# Patient Record
Sex: MALE | Race: WHITE | NOT HISPANIC OR LATINO | Employment: FULL TIME | URBAN - METROPOLITAN AREA
[De-identification: names, ages, dates, MRNs, and addresses within clinical notes are randomized per-mention and may not be internally consistent; named-entity substitution may affect disease eponyms.]

---

## 2023-03-30 ENCOUNTER — OFFICE VISIT (OUTPATIENT)
Dept: URGENT CARE | Facility: URGENT CARE | Age: 28
End: 2023-03-30
Payer: COMMERCIAL

## 2023-03-30 VITALS
TEMPERATURE: 98.5 F | WEIGHT: 202 LBS | HEART RATE: 73 BPM | SYSTOLIC BLOOD PRESSURE: 129 MMHG | DIASTOLIC BLOOD PRESSURE: 87 MMHG | OXYGEN SATURATION: 97 %

## 2023-03-30 DIAGNOSIS — R30.0 DYSURIA: Primary | ICD-10-CM

## 2023-03-30 LAB
ALBUMIN UR-MCNC: NEGATIVE MG/DL
ANION GAP SERPL CALCULATED.3IONS-SCNC: 12 MMOL/L (ref 7–15)
APPEARANCE UR: CLEAR
BASOPHILS # BLD AUTO: 0 10E3/UL (ref 0–0.2)
BASOPHILS NFR BLD AUTO: 0 %
BILIRUB UR QL STRIP: NEGATIVE
BUN SERPL-MCNC: 18.1 MG/DL (ref 6–20)
CALCIUM SERPL-MCNC: 10 MG/DL (ref 8.6–10)
CHLORIDE SERPL-SCNC: 101 MMOL/L (ref 98–107)
COLOR UR AUTO: YELLOW
CREAT SERPL-MCNC: 1.06 MG/DL (ref 0.67–1.17)
DEPRECATED HCO3 PLAS-SCNC: 28 MMOL/L (ref 22–29)
EOSINOPHIL # BLD AUTO: 0.1 10E3/UL (ref 0–0.7)
EOSINOPHIL NFR BLD AUTO: 1 %
ERYTHROCYTE [DISTWIDTH] IN BLOOD BY AUTOMATED COUNT: 13.6 % (ref 10–15)
GFR SERPL CREATININE-BSD FRML MDRD: >90 ML/MIN/1.73M2
GLUCOSE SERPL-MCNC: 96 MG/DL (ref 70–99)
GLUCOSE UR STRIP-MCNC: NEGATIVE MG/DL
HCT VFR BLD AUTO: 47.5 % (ref 40–53)
HGB BLD-MCNC: 15.7 G/DL (ref 13.3–17.7)
HGB UR QL STRIP: NEGATIVE
HOLD SPECIMEN: NORMAL
KETONES UR STRIP-MCNC: NEGATIVE MG/DL
LEUKOCYTE ESTERASE UR QL STRIP: NEGATIVE
LYMPHOCYTES # BLD AUTO: 2.1 10E3/UL (ref 0.8–5.3)
LYMPHOCYTES NFR BLD AUTO: 29 %
MCH RBC QN AUTO: 30.3 PG (ref 26.5–33)
MCHC RBC AUTO-ENTMCNC: 33.1 G/DL (ref 31.5–36.5)
MCV RBC AUTO: 92 FL (ref 78–100)
MONOCYTES # BLD AUTO: 0.6 10E3/UL (ref 0–1.3)
MONOCYTES NFR BLD AUTO: 8 %
NEUTROPHILS # BLD AUTO: 4.4 10E3/UL (ref 1.6–8.3)
NEUTROPHILS NFR BLD AUTO: 62 %
NITRATE UR QL: NEGATIVE
PH UR STRIP: 6.5 [PH] (ref 5–7)
PLATELET # BLD AUTO: 242 10E3/UL (ref 150–450)
POTASSIUM SERPL-SCNC: 4.7 MMOL/L (ref 3.4–5.3)
PSA SERPL DL<=0.01 NG/ML-MCNC: 0.24 NG/ML
RBC # BLD AUTO: 5.19 10E6/UL (ref 4.4–5.9)
SODIUM SERPL-SCNC: 141 MMOL/L (ref 136–145)
SP GR UR STRIP: 1.01 (ref 1–1.03)
UROBILINOGEN UR STRIP-ACNC: 0.2 E.U./DL
WBC # BLD AUTO: 7.1 10E3/UL (ref 4–11)

## 2023-03-30 PROCEDURE — 81003 URINALYSIS AUTO W/O SCOPE: CPT

## 2023-03-30 PROCEDURE — 80048 BASIC METABOLIC PNL TOTAL CA: CPT | Performed by: PHYSICIAN ASSISTANT

## 2023-03-30 PROCEDURE — G0103 PSA SCREENING: HCPCS | Performed by: PHYSICIAN ASSISTANT

## 2023-03-30 PROCEDURE — 85025 COMPLETE CBC W/AUTO DIFF WBC: CPT | Performed by: PHYSICIAN ASSISTANT

## 2023-03-30 PROCEDURE — 36415 COLL VENOUS BLD VENIPUNCTURE: CPT | Performed by: PHYSICIAN ASSISTANT

## 2023-03-30 PROCEDURE — 99203 OFFICE O/P NEW LOW 30 MIN: CPT | Performed by: PHYSICIAN ASSISTANT

## 2023-03-30 RX ORDER — OXCARBAZEPINE 600 MG/1
600 TABLET, FILM COATED ORAL DAILY
COMMUNITY
Start: 2023-02-22

## 2023-03-30 NOTE — PROGRESS NOTES
Dysuria  - UA macro with reflex to Microscopic and Culture - Clinc Collect  - PSA, screen; Future  - CBC with platelets and differential; Future  - Basic metabolic panel  (Ca, Cl, CO2, Creat, Gluc, K, Na, BUN); Future  - Adult Urology  Referral; Future  - PSA, screen  - CBC with platelets and differential  - Basic metabolic panel  (Ca, Cl, CO2, Creat, Gluc, K, Na, BUN)  - Extra Urine (LAB USE ONLY)    The etiology of the patient's symptoms is puzzling.  Awaiting PSA to assess for prostate issues.  Of asked the patient to increase his water intake and consider using Azo over-the-counter.  If no improvement in the next week, I would like him to follow-up with urology for further work-up.    Prieto Schwartz PA-C  [facility]    Subjective   27 year old who presents to clinic today for the following health issues:    UTI       HPI     Genitourinary - Male  Onset/Duration: Today   Description:   Dysuria (painful urination): YES}  Hematuria (blood in urine): No  Frequency: YES  Waking at night to urinate: No  Hesitancy (delay in urine): No  Retention (unable to empty): YES  Decrease in urinary flow: YES  Incontinence: No  Progression of Symptoms:  worsening  Accompanying Signs & Symptoms:  Fever: No  Back/Flank pain: No  Urethral discharge: No  Testicle lumps/masses/pain: No  Nausea and/or vomiting: No  Abdominal pain: No  History:   History of frequent UTI s: No but patient had a similar experience that lasted several days   History of kidney stones: No  History of hernias: No  Personal or Family history of Prostate problems: No  Sexually active: YES- No concern about STIw  Precipitating or alleviating factors: None  Therapies tried and outcome: none    Review of Systems   Review of Systems   See HPI    Objective    Temp: 98.5  F (36.9  C) Temp src: Oral BP: 129/87 Pulse: 73     SpO2: 97 %       Physical Exam   Physical Exam  Constitutional:       General: He is not in acute distress.      Appearance: Normal appearance. He is normal weight. He is not ill-appearing, toxic-appearing or diaphoretic.   HENT:      Head: Normocephalic and atraumatic.   Cardiovascular:      Rate and Rhythm: Normal rate.      Pulses: Normal pulses.   Pulmonary:      Effort: Pulmonary effort is normal. No respiratory distress.   Neurological:      Mental Status: He is alert.   Psychiatric:         Mood and Affect: Mood normal.         Behavior: Behavior normal.         Thought Content: Thought content normal.         Judgment: Judgment normal.          Results for orders placed or performed in visit on 03/30/23 (from the past 24 hour(s))   UA macro with reflex to Microscopic and Culture - Clinc Collect    Specimen: Urine, Midstream   Result Value Ref Range    Color Urine Yellow Colorless, Straw, Light Yellow, Yellow    Appearance Urine Clear Clear    Glucose Urine Negative Negative mg/dL    Bilirubin Urine Negative Negative    Ketones Urine Negative Negative mg/dL    Specific Gravity Urine 1.010 1.003 - 1.035    Blood Urine Negative Negative    pH Urine 6.5 5.0 - 7.0    Protein Albumin Urine Negative Negative mg/dL    Urobilinogen Urine 0.2 0.2, 1.0 E.U./dL    Nitrite Urine Negative Negative    Leukocyte Esterase Urine Negative Negative    Narrative    Microscopic not indicated   CBC with platelets and differential    Narrative    The following orders were created for panel order CBC with platelets and differential.  Procedure                               Abnormality         Status                     ---------                               -----------         ------                     CBC with platelets and d...[[id number]]                      Final result                 Please view results for these tests on the individual orders.   CBC with platelets and differential   Result Value Ref Range    WBC Count 7.1 4.0 - 11.0 10e3/uL    RBC Count 5.19 4.40 - 5.90 10e6/uL    Hemoglobin 15.7 13.3 - 17.7 g/dL    Hematocrit 47.5  40.0 - 53.0 %    MCV 92 78 - 100 fL    MCH 30.3 26.5 - 33.0 pg    MCHC 33.1 31.5 - 36.5 g/dL    RDW 13.6 10.0 - 15.0 %    Platelet Count 242 150 - 450 10e3/uL    % Neutrophils 62 %    % Lymphocytes 29 %    % Monocytes 8 %    % Eosinophils 1 %    % Basophils 0 %    Absolute Neutrophils 4.4 1.6 - 8.3 10e3/uL    Absolute Lymphocytes 2.1 0.8 - 5.3 10e3/uL    Absolute Monocytes 0.6 0.0 - 1.3 10e3/uL    Absolute Eosinophils 0.1 0.0 - 0.7 10e3/uL    Absolute Basophils 0.0 0.0 - 0.2 10e3/uL   Extra Urine (LAB USE ONLY)   Result Value Ref Range    Hold Specimen JIC

## 2023-03-31 ENCOUNTER — TELEPHONE (OUTPATIENT)
Dept: UROLOGY | Facility: CLINIC | Age: 28
End: 2023-03-31
Payer: COMMERCIAL

## 2023-03-31 NOTE — TELEPHONE ENCOUNTER
Health Call Center    Phone Message    May a detailed message be left on voicemail: yes     Reason for Call: Appointment Intake    Referring Provider Name: Prieto Schwartz PA-C    Diagnosis and/or Symptoms: Dysuria    Patient being referred for Urgent Appointment (1-2 weeks) by referring provider. Writer unable to schedule patient within timeframe requested. Sending encounter message for clinic review and follow-up with patient for scheduling. Thank you!     Action Taken: Message routed to:  [facility] Urology    Travel Screening: Not Applicable

## 2023-05-01 ENCOUNTER — OFFICE VISIT (OUTPATIENT)
Dept: URGENT CARE | Facility: URGENT CARE | Age: 28
End: 2023-05-01
Payer: COMMERCIAL

## 2023-05-01 VITALS
DIASTOLIC BLOOD PRESSURE: 74 MMHG | HEART RATE: 92 BPM | WEIGHT: 210 LBS | TEMPERATURE: 98.5 F | SYSTOLIC BLOOD PRESSURE: 117 MMHG | OXYGEN SATURATION: 97 %

## 2023-05-01 DIAGNOSIS — H00.11 CHALAZION OF RIGHT UPPER EYELID: Primary | ICD-10-CM

## 2023-05-01 DIAGNOSIS — H00.12 CHALAZION OF RIGHT LOWER EYELID: ICD-10-CM

## 2023-05-01 PROCEDURE — 99213 OFFICE O/P EST LOW 20 MIN: CPT | Performed by: PHYSICIAN ASSISTANT

## 2023-05-01 RX ORDER — ERYTHROMYCIN 5 MG/G
OINTMENT OPHTHALMIC
COMMUNITY
Start: 2023-04-08

## 2023-05-01 RX ORDER — POLYMYXIN B SULFATE AND TRIMETHOPRIM 1; 10000 MG/ML; [USP'U]/ML
1-2 SOLUTION OPHTHALMIC EVERY 4 HOURS
Qty: 10 ML | Refills: 0 | Status: SHIPPED | OUTPATIENT
Start: 2023-05-01

## 2023-05-01 NOTE — PATIENT INSTRUCTIONS
Recommend using a Q-tip and baby shampoo to rinse the upper and lower eyelids eyelid at least twice per day.  Use warm water.  Place baby shampoo on a Q-tip, retract the eyelid and perform the scrub.    Recommend applying warm compress to affected eye(s) four times per day to relieve irritation and itching.  Apply the compress and 15 to 20-minute intervals.  Recommend Zyrtec or Claritin for itching.     Return to clinic if symptoms persist more than 5 to 7 days or if you develop symptoms such as sinus pain, headaches, ear pain/discharge.    Advise urgent follow up if you have sudden decreased visual acuity, sudden vision changes, increased pain, severely worsening headaches, redness/swelling/tenderness around the eyes.    Follow-up with [facility] if symptoms do not improve in 1 week.  We will need to call and schedule an appointment.

## 2023-05-01 NOTE — PROGRESS NOTES
Assessment & Plan     Chalazion of right upper eyelid      Chalazion of right lower eyelid  Recommend discontinuing erythromycin ointment at this time, starting Polytrim today.  Discussed cares for chalazion, primarily responds to warm compresses.    Recommend using a Q-tip and baby shampoo to rinse the upper and lower eyelids eyelid at least twice per day.  Use warm water.  Place baby shampoo on a Q-tip, retract the eyelid and perform the scrub.    Recommend applying warm compress to affected eye(s) four times per day to relieve irritation and itching.  Apply the compress and 15 to 20-minute intervals.  Recommend Zyrtec or Claritin for itching.     Return to clinic if symptoms persist more than 5 to 7 days or if you develop symptoms such as sinus pain, headaches, ear pain/discharge.    Advise urgent follow up if you have sudden decreased visual acuity, sudden vision changes, increased pain, severely worsening headaches, redness/swelling/tenderness around the eyes.    Follow-up with [facility] if symptoms do not improve in 1 week.  We will need to call and schedule an appointment.                     No follow-ups on file.    Jeremie Mederos PA-C  [facility] [place] Nunes is a 28 year old, presenting for the following health issues:  Eye Problem (Right side bottom and top lumps/bumps, possible stye? Has a Rx. Ointment that isn't helping much, this started 2-3 weeks ago )         View : No data to display.              HPI   The patient is a 28-year-old male who presents for evaluation of bumps on the eyelids, right eye.  The bumps have been present over the past 4 to 6 weeks.  He was evaluated via E-visit 1.5 weeks ago, was started on erythromycin ointment which has not helped.  He has applied warm packs on occasion to the right eye, which has helped the lower eyelid lesion to pop and drain at 1 point.  He denies having any pain, no acute vision issues, no headaches, no  numbness or tingling or weakness in the face.  No foreign bodies in the eye.  He has had some styes on occasion over the past 3 years, to the 3 days.          Review of Systems   Constitutional: Negative.    Eyes: Positive for pain. Negative for photophobia, discharge, redness, itching and visual disturbance.            Objective    /74 (BP Location: Left arm, Patient Position: Sitting, Cuff Size: Adult Large)   Pulse 92   Temp 98.5  F (36.9  C) (Tympanic)   Wt 95.3 kg (210 lb)   SpO2 97%   There is no height or weight on file to calculate BMI.  Physical Exam  Vitals reviewed.   Constitutional:       General: He is not in acute distress.     Appearance: Normal appearance. He is not ill-appearing.   Eyes:      General: Vision grossly intact. No visual field deficit.     Extraocular Movements:      Right eye: Normal extraocular motion and no nystagmus.      Left eye: Normal extraocular motion and no nystagmus.      Pupils: Pupils are equal, round, and reactive to light. Pupils are equal.      Right eye: Pupil is round, reactive and not sluggish.      Left eye: Pupil is round, reactive and not sluggish.      Funduscopic exam:     Right eye: Red reflex present.         Left eye: Red reflex present.       Comments: Small chalazion present on the right upper eyelid, chalazion present on the right lower eyelid about 6 mm in diameter.   Neurological:      Mental Status: He is alert and oriented to person, place, and time.

## 2023-05-02 ENCOUNTER — VIRTUAL VISIT (OUTPATIENT)
Dept: UROLOGY | Facility: CLINIC | Age: 28
End: 2023-05-02
Attending: PHYSICIAN ASSISTANT
Payer: COMMERCIAL

## 2023-05-02 VITALS — WEIGHT: 210 LBS | BODY MASS INDEX: 29.4 KG/M2 | HEIGHT: 71 IN

## 2023-05-02 DIAGNOSIS — R30.0 DYSURIA: ICD-10-CM

## 2023-05-02 PROCEDURE — 99204 OFFICE O/P NEW MOD 45 MIN: CPT | Mod: VID | Performed by: PHYSICIAN ASSISTANT

## 2023-05-02 RX ORDER — PROPRANOLOL HYDROCHLORIDE 10 MG/1
TABLET ORAL
COMMUNITY
Start: 2023-02-22

## 2023-05-02 RX ORDER — HYDROXYZINE HYDROCHLORIDE 25 MG/1
TABLET, FILM COATED ORAL
COMMUNITY
Start: 2023-02-22

## 2023-05-02 ASSESSMENT — PAIN SCALES - GENERAL: PAINLEVEL: NO PAIN (0)

## 2023-05-02 NOTE — PROGRESS NOTES
Not all the time  Dysuria comes and goes  sommtimes it is for half a day  Feels like pinching not so much dysuria    Des is a 28 year old who is being evaluated via a billable video visit.      How would you like to obtain your AVS? Isaac  If the video visit is dropped, the invitation should be resent by: Text to cell phone: [phone]  Will anyone else be joining your video visit? No        Video-Visit Details    Type of service:  Video Visit     Originating Location (pt. Location): Home    Distant Location (provider location):  On-site  Platform used for Video Visit: [employer]  Start time: 12:03pm  End time: 12:19pm    May 2, 2023      CC: Painful urination    HPI:  Des Olvera is a pleasant 28 year old male who presents for consultation from Prieto Schwartz PA-C for evaluation of the above.  Seen in UC 3/30/23 for dysuria, sense of not emptying, freq, slowing of his stream. No gross hematuria, pelvic or abdominal pain, hx of stones. UA negative, PSA 0.24, CBC and BMP normal.     Same scenario occurred in 2014. No concerns at UC visit at that time for infection.    Drinks coffee and soda x3-4. Some water. Significant irritants in his diet.     PMH: no urologic hx    History reviewed. No pertinent surgical history.    Social History     Socioeconomic History     Marital status:      Spouse name: Not on file     Number of children: Not on file     Years of education: Not on file     Highest education level: Not on file   Occupational History     Not on file   Tobacco Use     Smoking status: Never     Smokeless tobacco: Not on file   Vaping Use     Vaping status: Not on file   Substance and Sexual Activity     Alcohol use: No     Drug use: No     Sexual activity: Not on file   Other Topics Concern     Not on file   Social History Narrative     Not on file     Social Determinants of Health     Financial Resource Strain: Not on file   Food Insecurity: Not on file   Transportation Needs: Not on file  "  Physical Activity: Not on file   Stress: Not on file   Social Connections: Not on file   Intimate Partner Violence: Not on file   Housing Stability: Not on file       No family history on file.    ROS:14 point ROS neg other than the symptoms noted above in the HPI.    Allergies   Allergen Reactions     Pcn [Penicillins] Rash       Current Outpatient Medications   Medication     erythromycin (ROMYCIN) 5 MG/GM ophthalmic ointment     hydrOXYzine (ATARAX) 25 MG tablet     OXcarbazepine (TRILEPTAL) 600 MG tablet     propranolol (INDERAL) 10 MG tablet     FLUoxetine HCl (PROZAC PO)     OXcarbazepine (TRILEPTAL) 300 MG tablet     trimethoprim-polymyxin b (POLYTRIM) 21163-0.1 UNIT/ML-% ophthalmic solution     No current facility-administered medications for this visit.         PEx:   Height 1.803 m (5' 11\"), weight 95.3 kg (210 lb).  GEN: NAD  EYES: EOMI  MOUTH: MMM  NECK: Supple    NEURO: AAO    Urine: negative  PVR: cc    A/P: Deshossein Olvera is a 28 year old male with dysuria 2/2 high vol irritant consumption.  - Track consumption and symptoms  - Bladder irritant list provided. Reduce irritants.   - Hydrate with water  -We discussed this is not prostate related given his age and fleeting symptoms.     Gladys Sherwood PA-C  [facility]        26 minutes spent on the date of the encounter doing chart review, review of outside records, review of test results, interpretation of tests, patient visit and documentation                   "

## 2023-05-02 NOTE — PATIENT INSTRUCTIONS
Below is a list of things that can irritate the bladder and should be eliminated/reduced:    Caffeinated soft drinks.  Coffee.  Tea.  Chocolate.  Tomato-based foods.  Acidic juices and fruits. (includes cranberry juice)  Alcohol.  Nicotine  Carbonated drinks.  Aspartame/Nutrasweet.

## 2023-05-02 NOTE — LETTER
5/2/2023       RE: Des Olvera  [address] [address]     Dear Colleague,    Thank you for referring your patient, Des Olvera, to the [facility] [place] at [facility]. Please see a copy of my visit note below.    Not all the time  Dysuria comes and goes  sommtimes it is for half a day  Feels like pinching not so much dysuria    Des is a 28 year old who is being evaluated via a billable video visit.      How would you like to obtain your AVS? MyChart  If the video visit is dropped, the invitation should be resent by: Text to cell phone: [phone]  Will anyone else be joining your video visit? No        Video-Visit Details    Type of service:  Video Visit     Originating Location (pt. Location): Home    Distant Location (provider location):  On-site  Platform used for Video Visit: [email or web address]  Start time: 12:03pm  End time: 12:19pm    May 2, 2023      CC: Painful urination    HPI:  Des Olvera is a pleasant 28 year old male who presents for consultation from Prieto Schwartz PA-C for evaluation of the above.  Seen in UC 3/30/23 for dysuria, sense of not emptying, freq, slowing of his stream. No gross hematuria, pelvic or abdominal pain, hx of stones. UA negative, PSA 0.24, CBC and BMP normal.     Same scenario occurred in 2014. No concerns at UC visit at that time for infection.    Drinks coffee and soda x3-4. Some water. Significant irritants in his diet.     PMH: no urologic hx    History reviewed. No pertinent surgical history.    Social History     Socioeconomic History    Marital status:      Spouse name: Not on file    Number of children: Not on file    Years of education: Not on file    Highest education level: Not on file   Occupational History    Not on file   Tobacco Use    Smoking status: Never    Smokeless tobacco: Not on file   Vaping Use    Vaping status: Not on file   Substance and Sexual Activity     "Alcohol use: No    Drug use: No    Sexual activity: Not on file   Other Topics Concern    Not on file   Social History Narrative    Not on file     Social Determinants of Health     Financial Resource Strain: Not on file   Food Insecurity: Not on file   Transportation Needs: Not on file   Physical Activity: Not on file   Stress: Not on file   Social Connections: Not on file   Intimate Partner Violence: Not on file   Housing Stability: Not on file       No family history on file.    ROS:14 point ROS neg other than the symptoms noted above in the HPI.    Allergies   Allergen Reactions    Pcn [Penicillins] Rash       Current Outpatient Medications   Medication    erythromycin (ROMYCIN) 5 MG/GM ophthalmic ointment    hydrOXYzine (ATARAX) 25 MG tablet    OXcarbazepine (TRILEPTAL) 600 MG tablet    propranolol (INDERAL) 10 MG tablet    FLUoxetine HCl (PROZAC PO)    OXcarbazepine (TRILEPTAL) 300 MG tablet    trimethoprim-polymyxin b (POLYTRIM) 51482-7.1 UNIT/ML-% ophthalmic solution     No current facility-administered medications for this visit.         PEx:   Height 1.803 m (5' 11\"), weight 95.3 kg (210 lb).  GEN: NAD  EYES: EOMI  MOUTH: MMM  NECK: Supple    NEURO: AAO    Urine: negative  PVR: cc    A/P: Des JUAN ALBERTO Olvera is a 28 year old male with dysuria 2/2 high vol irritant consumption.  - Track consumption and symptoms  - Bladder irritant list provided. Reduce irritants.   - Hydrate with water  -We discussed this is not prostate related given his age and fleeting symptoms.     Gladys Sherwood PA-C  [facility]        26 minutes spent on the date of the encounter doing chart review, review of outside records, review of test results, interpretation of tests, patient visit and documentation   "

## 2023-05-03 ASSESSMENT — ENCOUNTER SYMPTOMS
EYE PAIN: 1
EYE ITCHING: 0
PHOTOPHOBIA: 0
CONSTITUTIONAL NEGATIVE: 1
EYE REDNESS: 0
EYE DISCHARGE: 0

## 2023-05-03 ASSESSMENT — VISUAL ACUITY: OU: 1
